# Patient Record
Sex: MALE
[De-identification: names, ages, dates, MRNs, and addresses within clinical notes are randomized per-mention and may not be internally consistent; named-entity substitution may affect disease eponyms.]

---

## 2023-09-06 PROBLEM — Z00.00 ENCOUNTER FOR PREVENTIVE HEALTH EXAMINATION: Status: ACTIVE | Noted: 2023-09-06

## 2023-09-08 ENCOUNTER — APPOINTMENT (OUTPATIENT)
Dept: HEART AND VASCULAR | Facility: CLINIC | Age: 73
End: 2023-09-08
Payer: MEDICARE

## 2023-09-08 ENCOUNTER — NON-APPOINTMENT (OUTPATIENT)
Age: 73
End: 2023-09-08

## 2023-09-08 VITALS — BODY MASS INDEX: 22.33 KG/M2 | HEIGHT: 65 IN | WEIGHT: 134 LBS | HEART RATE: 84 BPM

## 2023-09-08 VITALS — SYSTOLIC BLOOD PRESSURE: 148 MMHG | DIASTOLIC BLOOD PRESSURE: 82 MMHG

## 2023-09-08 DIAGNOSIS — R09.89 OTHER SPECIFIED SYMPTOMS AND SIGNS INVOLVING THE CIRCULATORY AND RESPIRATORY SYSTEMS: ICD-10-CM

## 2023-09-08 DIAGNOSIS — C61 MALIGNANT NEOPLASM OF PROSTATE: ICD-10-CM

## 2023-09-08 DIAGNOSIS — R01.1 CARDIAC MURMUR, UNSPECIFIED: ICD-10-CM

## 2023-09-08 DIAGNOSIS — Z85.6 PERSONAL HISTORY OF LEUKEMIA: ICD-10-CM

## 2023-09-08 DIAGNOSIS — E78.5 HYPERLIPIDEMIA, UNSPECIFIED: ICD-10-CM

## 2023-09-08 PROCEDURE — 99204 OFFICE O/P NEW MOD 45 MIN: CPT

## 2023-09-08 PROCEDURE — 93306 TTE W/DOPPLER COMPLETE: CPT

## 2023-09-08 PROCEDURE — 93000 ELECTROCARDIOGRAM COMPLETE: CPT

## 2023-09-08 PROCEDURE — 93880 EXTRACRANIAL BILAT STUDY: CPT

## 2023-09-08 RX ORDER — TAMSULOSIN HYDROCHLORIDE 0.4 MG/1
0.4 CAPSULE ORAL
Refills: 0 | Status: ACTIVE | COMMUNITY

## 2023-09-10 PROBLEM — Z85.6: Status: RESOLVED | Noted: 2023-09-08 | Resolved: 2023-09-10

## 2023-09-10 NOTE — ADDENDUM
[FreeTextEntry1] : I, Cesar Rios, assisted in documentation on 09/08/2023 acting as a scribe for Dr. Cesar Wells.

## 2023-09-10 NOTE — ASSESSMENT
[FreeTextEntry1] : Patient's a lipid panel was reviewed and based on a [place] calculator has a 27% 10-year risk of cardiovascular events which is certainly little higher than I would have thought considering his lack of any significant risk factors.  We discussed the pros and cons of using statin therapy I did recommend a calcium score to further elucidate his long-term risk and if there is evidence of coronary plaque based on the calcium score certainly would advise initiating therapy with a statin.  He currently maintains obviously a lean body weight and is adhering to a good cardiovascular diet.  Baseline echocardiogram revealed a mild mitral regurgitation which was present in 2020 no significant carotid intimal plaque was noted.

## 2023-09-10 NOTE — HISTORY OF PRESENT ILLNESS
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

## 2023-09-12 ENCOUNTER — TRANSCRIPTION ENCOUNTER (OUTPATIENT)
Age: 73
End: 2023-09-12

## 2023-10-23 ENCOUNTER — TRANSCRIPTION ENCOUNTER (OUTPATIENT)
Age: 73
End: 2023-10-23

## 2024-01-24 ENCOUNTER — TRANSCRIPTION ENCOUNTER (OUTPATIENT)
Age: 74
End: 2024-01-24